# Patient Record
Sex: FEMALE | Race: WHITE | NOT HISPANIC OR LATINO | Employment: FULL TIME | URBAN - METROPOLITAN AREA
[De-identification: names, ages, dates, MRNs, and addresses within clinical notes are randomized per-mention and may not be internally consistent; named-entity substitution may affect disease eponyms.]

---

## 2024-02-20 ENCOUNTER — TELEPHONE (OUTPATIENT)
Dept: PRIMARY CARE | Facility: CLINIC | Age: 54
End: 2024-02-20

## 2024-02-20 NOTE — TELEPHONE ENCOUNTER
Patient called requesting to speak with provider regarding patient daughter Vaishali Recio. 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

## 2024-06-05 ENCOUNTER — APPOINTMENT (OUTPATIENT)
Dept: PRIMARY CARE | Facility: CLINIC | Age: 54
End: 2024-06-05
Payer: COMMERCIAL

## 2024-08-15 ENCOUNTER — OFFICE VISIT (OUTPATIENT)
Dept: PRIMARY CARE | Facility: CLINIC | Age: 54
End: 2024-08-15
Payer: COMMERCIAL

## 2024-08-15 VITALS
HEIGHT: 62 IN | OXYGEN SATURATION: 97 % | DIASTOLIC BLOOD PRESSURE: 82 MMHG | HEART RATE: 89 BPM | TEMPERATURE: 98.1 F | SYSTOLIC BLOOD PRESSURE: 128 MMHG | BODY MASS INDEX: 30.33 KG/M2 | WEIGHT: 164.8 LBS | RESPIRATION RATE: 16 BRPM

## 2024-08-15 DIAGNOSIS — B37.2 YEAST INFECTION OF THE SKIN: ICD-10-CM

## 2024-08-15 DIAGNOSIS — H10.13 ALLERGIC CONJUNCTIVITIS OF BOTH EYES: Primary | ICD-10-CM

## 2024-08-15 DIAGNOSIS — R03.0 ELEVATED BLOOD PRESSURE READING: ICD-10-CM

## 2024-08-15 PROCEDURE — 99213 OFFICE O/P EST LOW 20 MIN: CPT | Performed by: NURSE PRACTITIONER

## 2024-08-15 RX ORDER — LISINOPRIL 2.5 MG/1
2.5 TABLET ORAL DAILY
Qty: 100 TABLET | Refills: 0 | Status: SHIPPED | OUTPATIENT
Start: 2024-08-15 | End: 2025-09-19

## 2024-08-15 RX ORDER — NYSTATIN 100000 [USP'U]/G
1 POWDER TOPICAL 2 TIMES DAILY
Qty: 60 G | Refills: 0 | Status: SHIPPED | OUTPATIENT
Start: 2024-08-15 | End: 2025-08-15

## 2024-08-15 RX ORDER — OLOPATADINE HYDROCHLORIDE 1 MG/ML
1 SOLUTION/ DROPS OPHTHALMIC 2 TIMES DAILY PRN
Qty: 5 ML | Refills: 2 | Status: SHIPPED | OUTPATIENT
Start: 2024-08-15 | End: 2024-12-13

## 2024-08-15 RX ORDER — CETIRIZINE HYDROCHLORIDE 10 MG/1
10 TABLET ORAL DAILY
Qty: 30 TABLET | Refills: 2 | Status: SHIPPED | OUTPATIENT
Start: 2024-08-15 | End: 2024-11-13

## 2024-08-15 SDOH — ECONOMIC STABILITY: FOOD INSECURITY: WITHIN THE PAST 12 MONTHS, YOU WORRIED THAT YOUR FOOD WOULD RUN OUT BEFORE YOU GOT MONEY TO BUY MORE.: NEVER TRUE

## 2024-08-15 SDOH — ECONOMIC STABILITY: FOOD INSECURITY: WITHIN THE PAST 12 MONTHS, THE FOOD YOU BOUGHT JUST DIDN'T LAST AND YOU DIDN'T HAVE MONEY TO GET MORE.: NEVER TRUE

## 2024-08-15 ASSESSMENT — PAIN SCALES - GENERAL: PAINLEVEL: 0-NO PAIN

## 2024-08-15 ASSESSMENT — PATIENT HEALTH QUESTIONNAIRE - PHQ9
SUM OF ALL RESPONSES TO PHQ9 QUESTIONS 1 AND 2: 0
2. FEELING DOWN, DEPRESSED OR HOPELESS: NOT AT ALL
1. LITTLE INTEREST OR PLEASURE IN DOING THINGS: NOT AT ALL

## 2024-08-15 ASSESSMENT — LIFESTYLE VARIABLES
HOW MANY STANDARD DRINKS CONTAINING ALCOHOL DO YOU HAVE ON A TYPICAL DAY: PATIENT DOES NOT DRINK
AUDIT-C TOTAL SCORE: 0
HOW OFTEN DO YOU HAVE A DRINK CONTAINING ALCOHOL: NEVER
SKIP TO QUESTIONS 9-10: 1
HOW OFTEN DO YOU HAVE SIX OR MORE DRINKS ON ONE OCCASION: NEVER

## 2024-08-15 ASSESSMENT — ENCOUNTER SYMPTOMS
OCCASIONAL FEELINGS OF UNSTEADINESS: 0
DEPRESSION: 0
LOSS OF SENSATION IN FEET: 0

## 2024-08-15 ASSESSMENT — COLUMBIA-SUICIDE SEVERITY RATING SCALE - C-SSRS
2. HAVE YOU ACTUALLY HAD ANY THOUGHTS OF KILLING YOURSELF?: NO
1. IN THE PAST MONTH, HAVE YOU WISHED YOU WERE DEAD OR WISHED YOU COULD GO TO SLEEP AND NOT WAKE UP?: NO
6. HAVE YOU EVER DONE ANYTHING, STARTED TO DO ANYTHING, OR PREPARED TO DO ANYTHING TO END YOUR LIFE?: NO

## 2024-08-15 NOTE — PROGRESS NOTES
"Subjective   Patient ID: Andreea Recio is a 53 y.o. female who presents for Dry Eye (Patient presents for issues with bilaeral dry eyes. Per patient they get very puffy.).  [id number] y.o. female presents today with itchy water bilateral eyes for the past month.      Itchy watery eyes for the past month   Happens yearly   Visual changes   Ophthalmology 3 weeks ago -- new glasses.   Ophthalmology aware     See in office eye exam    Recurrent yeast infection to bilateral breast contours.  Patient requesting refill on Nystatin powder.      Review of Systems  Review of systems: Present-feeling well. Not present-chills, fatigue and fever.  Skin: yeast infection to bilateral breast contours.    HEENT: See HPI.  Not present-headache, ear pain, nasal congestion, and sore throat.  Neck: Not present-neck pain, neck stiffness and swollen glands.  Respiratory: Not present-difficulty breathing, cough, bloody sputum.  Cardiovascular: Not present-abnormal blood pressure, chest pain, edema, palpitations, dyspnea on exertion.  Gastrointestinal: Not present-abdominal pain, bloody or very black stools, changes in bowel habits, heartburn, nausea and vomiting.  Genitourinary: Not present-change in bladder habits, hematuria, flank pain and dysuria.    Objective   BP (!) 143/93 (BP Location: Right arm, Patient Position: Sitting, BP Cuff Size: Adult)   Pulse 89   Temp 36.7 °C (98.1 °F) (Temporal)   Resp 16   Ht 1.575 m (5' 2\")   Wt 74.8 kg (164 lb 12.8 oz)   SpO2 97%   BMI 30.14 kg/m²      Physical Exam  General: Mental status-alert. General appearance-cooperative, well groomed and consistent with stated age. Not in acute distress. Orientation-oriented to time, place, purpose and person. Build and nutrition-well-nourished and well-developed. Hydration-well-hydrated.    Integumentary: Yeast infection to bilateral contours of breasts. Color-normal coloration of the skin. Skin moisture-normal skin moisture.    Head and neck: Neck-full " range of motion and supple. No lymphadenopathy, no palpable masses, and no nuchal rigidity.    EYES:  PERRLA, EOMI.  Mildly injected allergic conjunctivitis.     ENMT: Ears- no tenderness to the external auditory canal. The otoscopic exam: tympanic membrane-left-tympanic membrane is gray in appearance. Tmpanic membrane-right-tympanic membrane is gray in appearance. Nose: Frontal sinuses- tender, no purulent drainage. Maxillary sinuses-non-tender, no purulent drainage. Oral mucosa-pink. Oropharynx: No airway distress, no bulging of the pharyngeal wall, no edema of the uvula, no pharyngeal erythema.    Chest and lung exam: Auscultation: Breath sounds: normal. No adventitious lung sounds.    Cardiovascular: Auscultation-rhythm-regular.  Heart sounds-normal heart sounds S1 and S2. No murmurs or gallops appreciated. No carotid bruits bilaterally.    Assessment/Plan   Diagnoses and all orders for this visit:  Allergic conjunctivitis of both eyes  -     cetirizine (ZyrTEC) 10 mg tablet; Take 1 tablet (10 mg) by mouth once daily.  -     olopatadine (Patanol) 0.1 % ophthalmic solution; Administer 1 drop into both eyes 2 times a day as needed for allergies.  Yeast infection of the skin  -     nystatin (Mycostatin) 100,000 unit/gram powder; Apply 1 Application topically 2 times a day.  Elevated blood pressure reading  -     lisinopril 2.5 mg tablet; Take 1 tablet (2.5 mg) by mouth once daily.

## 2024-08-15 NOTE — PATIENT INSTRUCTIONS
Allergic conjunctivitis - she was given a prescription for Pataday as directed.   Zyrtec at night as needed.   Increase fluids and rest.   Contact office with worsening condition or no resolve over the next 72 hours.     Yeast infection -- nystatin powder as directed.    Hypertension - Start lisinopril 2.5 mg daily as directed.   Continue to monitor blood pressure.   Contact office with blood pressure > 160/90 or < 90/30.  DASH diet.     Looking forward to vacation --   Needs self care  Mother and  passed away taking care of all finances   Stressful job  Declines medication at this time.     She denies suicidal/homicidal ideation or plan.    Follow up in 2 weeks for bp check and ae

## 2024-08-29 ENCOUNTER — APPOINTMENT (OUTPATIENT)
Dept: PRIMARY CARE | Facility: CLINIC | Age: 54
End: 2024-08-29
Payer: COMMERCIAL

## 2024-09-13 PROBLEM — N92.6 IRREGULAR MENSTRUAL CYCLE: Status: ACTIVE | Noted: 2024-09-13

## 2024-09-13 PROBLEM — R26.89 ANTALGIC GAIT: Status: ACTIVE | Noted: 2023-06-20

## 2024-09-13 PROBLEM — I87.2 VENOUS INSUFFICIENCY (CHRONIC) (PERIPHERAL): Status: ACTIVE | Noted: 2023-06-20

## 2024-09-13 PROBLEM — R74.8 ELEVATED LIPASE: Status: ACTIVE | Noted: 2024-09-13

## 2024-09-13 PROBLEM — K21.9 GERD (GASTROESOPHAGEAL REFLUX DISEASE): Status: ACTIVE | Noted: 2024-09-13

## 2024-09-13 PROBLEM — R31.21 ASYMPTOMATIC MICROSCOPIC HEMATURIA: Status: ACTIVE | Noted: 2024-09-13

## 2024-09-13 PROBLEM — M25.571 PAIN IN RIGHT ANKLE AND JOINTS OF RIGHT FOOT: Status: ACTIVE | Noted: 2023-06-20

## 2024-09-13 PROBLEM — M77.52 LEFT CALCANEAL BURSITIS: Status: ACTIVE | Noted: 2023-06-20

## 2024-09-13 PROBLEM — R11.2 NAUSEA AND VOMITING: Status: ACTIVE | Noted: 2024-09-13

## 2024-09-13 PROBLEM — I10 PRIMARY HYPERTENSION: Status: ACTIVE | Noted: 2022-01-20

## 2024-09-13 PROBLEM — M72.2 PLANTAR FASCIITIS, LEFT: Status: ACTIVE | Noted: 2023-06-20

## 2024-09-13 PROBLEM — M54.6 THORACIC BACK PAIN: Status: ACTIVE | Noted: 2024-09-13

## 2024-09-13 PROBLEM — M25.572 PAIN IN JOINT OF LEFT FOOT: Status: ACTIVE | Noted: 2023-06-20

## 2024-09-13 PROBLEM — B37.2 SKIN YEAST INFECTION: Status: ACTIVE | Noted: 2024-09-13

## 2024-09-13 PROBLEM — J06.9 UPPER RESPIRATORY TRACT INFECTION: Status: ACTIVE | Noted: 2024-09-13

## 2024-09-13 PROBLEM — J45.909 ASTHMA (HHS-HCC): Status: ACTIVE | Noted: 2024-09-13

## 2024-09-13 PROBLEM — R91.1 LUNG NODULE: Status: ACTIVE | Noted: 2024-09-13

## 2024-09-13 PROBLEM — M79.671 HEEL PAIN, BILATERAL: Status: ACTIVE | Noted: 2023-06-20

## 2024-09-13 PROBLEM — M76.62 ACHILLES TENDINITIS OF LEFT LOWER EXTREMITY: Status: ACTIVE | Noted: 2023-06-20

## 2024-09-13 PROBLEM — N95.1 PERIMENOPAUSAL SYMPTOMS: Status: ACTIVE | Noted: 2024-09-13

## 2024-09-13 PROBLEM — M23.92 INTERNAL DERANGEMENT OF LEFT KNEE: Status: ACTIVE | Noted: 2024-01-06

## 2024-09-13 PROBLEM — L20.9 ATOPIC DERMATITIS: Status: ACTIVE | Noted: 2024-09-13

## 2024-09-13 PROBLEM — M19.072 PRIMARY OSTEOARTHRITIS OF LEFT FOOT: Status: ACTIVE | Noted: 2023-06-20

## 2024-09-13 PROBLEM — R05.3 PERSISTENT COUGH: Status: ACTIVE | Noted: 2024-09-13

## 2024-09-13 PROBLEM — M79.672 HEEL PAIN, BILATERAL: Status: ACTIVE | Noted: 2023-06-20

## 2024-09-13 PROBLEM — M20.12 HALLUX VALGUS (ACQUIRED), LEFT FOOT: Status: ACTIVE | Noted: 2023-06-20
